# Patient Record
Sex: FEMALE | Race: WHITE | NOT HISPANIC OR LATINO | URBAN - NONMETROPOLITAN AREA
[De-identification: names, ages, dates, MRNs, and addresses within clinical notes are randomized per-mention and may not be internally consistent; named-entity substitution may affect disease eponyms.]

---

## 2020-11-04 ENCOUNTER — OFFICE VISIT (OUTPATIENT)
Dept: URBAN - NONMETROPOLITAN AREA CLINIC 2 | Facility: CLINIC | Age: 75
End: 2020-11-04
Payer: MEDICARE

## 2020-11-04 DIAGNOSIS — K62.3 RECTAL PROLAPSE: ICD-10-CM

## 2020-11-04 DIAGNOSIS — R10.32 LEFT LOWER QUADRANT ABDOMINAL PAIN: ICD-10-CM

## 2020-11-04 DIAGNOSIS — Z86.010 PERSONAL HISTORY OF COLONIC POLYPS: ICD-10-CM

## 2020-11-04 PROCEDURE — G8482 FLU IMMUNIZE ORDER/ADMIN: HCPCS | Performed by: NURSE PRACTITIONER

## 2020-11-04 PROCEDURE — 1036F TOBACCO NON-USER: CPT | Performed by: NURSE PRACTITIONER

## 2020-11-04 PROCEDURE — G8427 DOCREV CUR MEDS BY ELIG CLIN: HCPCS | Performed by: NURSE PRACTITIONER

## 2020-11-04 PROCEDURE — 99214 OFFICE O/P EST MOD 30 MIN: CPT | Performed by: NURSE PRACTITIONER

## 2020-11-04 PROCEDURE — G8420 CALC BMI NORM PARAMETERS: HCPCS | Performed by: NURSE PRACTITIONER

## 2020-11-04 RX ORDER — LOSARTAN POTASSIUM 100 MG/1
TAKE 1 TABLET (100 MG) BY ORAL ROUTE ONCE DAILY TABLET, FILM COATED ORAL 1
Qty: 0 | Refills: 0 | Status: ACTIVE | COMMUNITY
Start: 1900-01-01

## 2020-11-04 RX ORDER — LATANOPROST/PF 0.005 %
DROPS OPHTHALMIC (EYE)
Qty: 0 | Refills: 0 | Status: ACTIVE | COMMUNITY
Start: 1900-01-01

## 2020-11-04 RX ORDER — POTASSIUM CHLORIDE 600 MG/1
TAKE 1 TABLET (8 MEQ) BY ORAL ROUTE ONCE DAILY TABLET, FILM COATED, EXTENDED RELEASE ORAL 1
Qty: 0 | Refills: 0 | Status: ACTIVE | COMMUNITY
Start: 1900-01-01

## 2020-11-04 RX ORDER — TRAZODONE HYDROCHLORIDE 50 MG/1
TABLET ORAL
Qty: 0 | Refills: 0 | Status: ACTIVE | COMMUNITY
Start: 1900-01-01

## 2020-11-04 RX ORDER — METFORMIN HYDROCHLORIDE 500 MG/1
TABLET, COATED ORAL
Qty: 0 | Refills: 0 | Status: ACTIVE | COMMUNITY
Start: 1900-01-01

## 2020-11-04 NOTE — HPI-TODAY'S VISIT:
11/4/2020 Mrs. Teresita Mcgraw presents for follow up with complaint of "rectum coming out". She feels rectal protrusion with defecation. She tried to avoid straining for bowel movement with bowel regimen including SS, Miralax, and fiber supplements. She reports this is causing irritation to her bottom. She denies pain. No rectal pressure or bleeding. She occasionally has some mild LLQ pain that resolves with flatus. Otherwise, she is doing well. No UGI sx. Recent labs with PCP were normal.   Last colonoscopy 2/1/2016 with a small TA removed from rectum and sigmoid/ascending diverticulosis. TG  5/22/20-Dr. Man First OV in 4 yrs. Pt returns to discuss repeat colonoscopy b/o PH of colon polyps. She also has some LLQ pain.   BMs are normal and have not changed over the last several yrs. She has no bleeding. She has not been anemic or low on iron. There is no FH of colon cancer or polyps. Her appetite and wt are stable.   She does have some LLQ pain that is better with a BM and passing gas. She does have a great deal of gas. There is no fever or night sweats. She has no urinary sx. There is no vaginal bleeding. She has no history of back issues.    This was a telephone visit only. Pt did not have the expertise or technology to do a video meeting. Total time was 9:16

## 2020-11-04 NOTE — PHYSICAL EXAM GASTROINTESTINAL
Abdomen , soft, nontender, nondistended , no guarding or rigidity , no masses palpable , normal bowel sounds , Liver and Spleen , no hepatomegaly present , no hepatosplenomegaly , liver nontender , spleen not palpable , Rectal , weak sphincter tone with no perineal irritation, external hemorrhoids, skin tags, rectal prolapse noted. No bleeding. No masses. No hard lesions.

## 2021-01-13 ENCOUNTER — OFFICE VISIT (OUTPATIENT)
Dept: URBAN - NONMETROPOLITAN AREA CLINIC 2 | Facility: CLINIC | Age: 76
End: 2021-01-13

## 2021-04-19 ENCOUNTER — OFFICE VISIT (OUTPATIENT)
Dept: URBAN - NONMETROPOLITAN AREA CLINIC 2 | Facility: CLINIC | Age: 76
End: 2021-04-19
Payer: MEDICARE

## 2021-04-19 ENCOUNTER — LAB OUTSIDE AN ENCOUNTER (OUTPATIENT)
Dept: URBAN - NONMETROPOLITAN AREA CLINIC 2 | Facility: CLINIC | Age: 76
End: 2021-04-19

## 2021-04-19 ENCOUNTER — DASHBOARD ENCOUNTERS (OUTPATIENT)
Age: 76
End: 2021-04-19

## 2021-04-19 VITALS
HEART RATE: 78 BPM | HEIGHT: 61 IN | SYSTOLIC BLOOD PRESSURE: 160 MMHG | DIASTOLIC BLOOD PRESSURE: 92 MMHG | TEMPERATURE: 96.7 F | WEIGHT: 110 LBS | BODY MASS INDEX: 20.77 KG/M2

## 2021-04-19 DIAGNOSIS — R10.32 LEFT LOWER QUADRANT ABDOMINAL PAIN: ICD-10-CM

## 2021-04-19 DIAGNOSIS — K62.89 ANAL IRRITATION: ICD-10-CM

## 2021-04-19 DIAGNOSIS — K62.3 RECTAL PROLAPSE: ICD-10-CM

## 2021-04-19 DIAGNOSIS — Z86.010 PERSONAL HISTORY OF COLONIC POLYPS: ICD-10-CM

## 2021-04-19 PROCEDURE — 99214 OFFICE O/P EST MOD 30 MIN: CPT | Performed by: NURSE PRACTITIONER

## 2021-04-19 RX ORDER — LOSARTAN POTASSIUM 100 MG/1
TAKE 1 TABLET (100 MG) BY ORAL ROUTE ONCE DAILY TABLET, FILM COATED ORAL 1
Qty: 0 | Refills: 0 | Status: ACTIVE | COMMUNITY
Start: 1900-01-01

## 2021-04-19 RX ORDER — METFORMIN HYDROCHLORIDE 500 MG/1
TABLET, COATED ORAL
Qty: 0 | Refills: 0 | Status: ACTIVE | COMMUNITY
Start: 1900-01-01

## 2021-04-19 RX ORDER — LATANOPROST/PF 0.005 %
DROPS OPHTHALMIC (EYE)
Qty: 0 | Refills: 0 | Status: ACTIVE | COMMUNITY
Start: 1900-01-01

## 2021-04-19 RX ORDER — METRONIDAZOLE 250 MG/1
1 TABLET TABLET ORAL THREE TIMES A DAY
Qty: 21 TABLET | Refills: 0 | OUTPATIENT
Start: 2021-04-19 | End: 2021-04-26

## 2021-04-19 RX ORDER — CARVEDILOL 6.25 MG/1
1 TABLET WITH FOOD TABLET, FILM COATED ORAL TWICE A DAY
Status: ACTIVE | COMMUNITY

## 2021-04-19 RX ORDER — NYSTATIN AND TRIAMCINOLONE ACETONIDE 100000; 1 [USP'U]/G; MG/G
1 APPLICATION CREAM TOPICAL TWICE A DAY
Qty: 30 GRAM | Refills: 0 | OUTPATIENT
Start: 2021-04-19 | End: 2021-04-29

## 2021-04-19 RX ORDER — TRAZODONE HYDROCHLORIDE 50 MG/1
TABLET ORAL
Qty: 0 | Refills: 0 | Status: ACTIVE | COMMUNITY
Start: 1900-01-01

## 2021-04-19 RX ORDER — SODIUM, POTASSIUM,MAG SULFATES 17.5-3.13G
354ML SOLUTION, RECONSTITUTED, ORAL ORAL
Qty: 354 MILLILITER | Refills: 0 | OUTPATIENT
Start: 2021-04-19 | End: 2021-04-20

## 2021-04-19 RX ORDER — POTASSIUM CHLORIDE 600 MG/1
TAKE 1 TABLET (8 MEQ) BY ORAL ROUTE ONCE DAILY TABLET, FILM COATED, EXTENDED RELEASE ORAL 1
Qty: 0 | Refills: 0 | Status: ACTIVE | COMMUNITY
Start: 1900-01-01

## 2021-04-19 NOTE — HPI-TODAY'S VISIT:
5/22/20-Dr. Man First OV in 4 yrs. Pt returns to discuss repeat colonoscopy b/o PH of colon polyps. She also has some LLQ pain.   BMs are normal and have not changed over the last several yrs. She has no bleeding. She has not been anemic or low on iron. There is no FH of colon cancer or polyps. Her appetite and wt are stable.   She does have some LLQ pain that is better with a BM and passing gas. She does have a great deal of gas. There is no fever or night sweats. She has no urinary sx. There is no vaginal bleeding. She has no history of back issues.   11/4/2020 Mrs. Teresita Mcgraw presents for follow up with complaint of "rectum coming out". She feels rectal protrusion with defecation. She tried to avoid straining for bowel movement with bowel regimen including SS, Miralax, and fiber supplements. She reports this is causing irritation to her bottom. She denies pain. No rectal pressure or bleeding. She occasionally has some mild LLQ pain that resolves with flatus. Otherwise, she is doing well. No UGI sx. Recent labs with PCP were normal.   Last colonoscopy 2/1/2016 with a small TA removed from rectum and sigmoid/ascending diverticulosis. TG  4/19/2021 Ms. Wesley Mcgraw is a very pleasant 75-year-old female who presents today with complaints of rectal irritation and inflammation anus.  This comes and goes and she has had trouble with this for several years.  She uses dexamethasone ointment around her anus and nystatin vaginally during these "flares".  She thinks these make the discomfort tolerable but symptoms do not resolve quickly with this.  It takes quite some time.  She denies rectal bleeding.  She does occasionally have some mucus from her anus or have excessive vaginal discharge.  She has had this area checked in the past and there is been no use.  She has seen multiple providers and has never been given a concrete diagnosis or treatment. She did see Dr. Marcia Mckee for rectal prolapse after her last appointment and had excision in the OR via Delorme procedure.  She did well after this. Her last colonoscopy in 2016 with 1 TA.  She is due for repeat.  Previously, she did not want to have subsequent colonoscopies and was planned to have annual FOBT with hemoglobins but with her recent anal discomfort she would like to go ahead with her regular colonoscopy.  TG

## 2021-04-19 NOTE — PHYSICAL EXAM GASTROINTESTINAL
Abdomen  , soft, nontender, nondistended , no masses palpable , normal bowel sounds , no hepatomegaly present RECTAL EXAM: normal exam, no anal irritation or irritation to the left buttock (where she has discomfort).

## 2021-06-15 ENCOUNTER — OFFICE VISIT (OUTPATIENT)
Dept: URBAN - NONMETROPOLITAN AREA SURGERY CENTER 1 | Facility: SURGERY CENTER | Age: 76
End: 2021-06-15

## 2021-07-20 ENCOUNTER — OFFICE VISIT (OUTPATIENT)
Dept: URBAN - NONMETROPOLITAN AREA CLINIC 2 | Facility: CLINIC | Age: 76
End: 2021-07-20

## 2025-01-14 NOTE — PHYSICAL EXAM SKIN:
no rashes , no suspicious lesions , no areas of discoloration , no jaundice present , good turgor , no masses , no tenderness on palpation Price (Do Not Change): 0.00 Instructions: This plan will send the code FBSE to the PM system.  DO NOT or CHANGE the price. Detail Level: Simple